# Patient Record
Sex: MALE | Race: WHITE | NOT HISPANIC OR LATINO | Employment: FULL TIME | URBAN - METROPOLITAN AREA
[De-identification: names, ages, dates, MRNs, and addresses within clinical notes are randomized per-mention and may not be internally consistent; named-entity substitution may affect disease eponyms.]

---

## 2020-04-26 ENCOUNTER — HOSPITAL ENCOUNTER (EMERGENCY)
Facility: HOSPITAL | Age: 35
Discharge: HOME | End: 2020-04-26
Attending: EMERGENCY MEDICINE | Admitting: EMERGENCY MEDICINE

## 2020-04-26 ENCOUNTER — APPOINTMENT (OUTPATIENT)
Dept: GENERAL RADIOLOGY | Facility: HOSPITAL | Age: 35
End: 2020-04-26

## 2020-04-26 VITALS
WEIGHT: 171.52 LBS | HEART RATE: 98 BPM | SYSTOLIC BLOOD PRESSURE: 147 MMHG | BODY MASS INDEX: 25.4 KG/M2 | DIASTOLIC BLOOD PRESSURE: 90 MMHG | TEMPERATURE: 99.1 F | RESPIRATION RATE: 21 BRPM | OXYGEN SATURATION: 98 % | HEIGHT: 69 IN

## 2020-04-26 DIAGNOSIS — M21.821 HILL SACHS DEFORMITY, RIGHT: ICD-10-CM
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
## 2020-04-26 DIAGNOSIS — S63.612A SPRAIN OF RIGHT MIDDLE FINGER, UNSPECIFIED SITE OF FINGER, INITIAL ENCOUNTER: ICD-10-CM

## 2020-04-26 DIAGNOSIS — S43.014A ANTERIOR DISLOCATION OF RIGHT SHOULDER, INITIAL ENCOUNTER: ICD-10-CM

## 2020-04-26 DIAGNOSIS — M79.5 FOREIGN BODY (FB) IN SOFT TISSUE: ICD-10-CM

## 2020-04-26 DIAGNOSIS — S81.801A AVULSION OF SKIN OF RIGHT LOWER LEG, INITIAL ENCOUNTER: ICD-10-CM

## 2020-04-26 PROCEDURE — 94770: CPT

## 2020-04-26 PROCEDURE — 73030 X-RAY EXAM OF SHOULDER: CPT

## 2020-04-26 PROCEDURE — 73590 X-RAY EXAM OF LOWER LEG: CPT

## 2020-04-26 PROCEDURE — 25010000002 ONDANSETRON PER 1 MG: Performed by: EMERGENCY MEDICINE

## 2020-04-26 PROCEDURE — 99152 MOD SED SAME PHYS/QHP 5/>YRS: CPT | Performed by: EMERGENCY MEDICINE

## 2020-04-26 PROCEDURE — 99283 EMERGENCY DEPT VISIT LOW MDM: CPT

## 2020-04-26 PROCEDURE — 90715 TDAP VACCINE 7 YRS/> IM: CPT | Performed by: EMERGENCY MEDICINE

## 2020-04-26 PROCEDURE — 23650 CLTX SHO DSLC W/MNPJ WO ANES: CPT | Performed by: EMERGENCY MEDICINE

## 2020-04-26 PROCEDURE — 99282 EMERGENCY DEPT VISIT SF MDM: CPT | Performed by: EMERGENCY MEDICINE

## 2020-04-26 PROCEDURE — 25010000002 FENTANYL CITRATE (PF) 100 MCG/2ML SOLUTION: Performed by: EMERGENCY MEDICINE

## 2020-04-26 PROCEDURE — 94799 UNLISTED PULMONARY SVC/PX: CPT

## 2020-04-26 PROCEDURE — 73130 X-RAY EXAM OF HAND: CPT

## 2020-04-26 PROCEDURE — 25010000002 TDAP 5-2.5-18.5 LF-MCG/0.5 SUSPENSION: Performed by: EMERGENCY MEDICINE

## 2020-04-26 PROCEDURE — 90471 IMMUNIZATION ADMIN: CPT | Performed by: EMERGENCY MEDICINE

## 2020-04-26 PROCEDURE — 73020 X-RAY EXAM OF SHOULDER: CPT

## 2020-04-26 RX ORDER — HYDROCODONE BITARTRATE AND ACETAMINOPHEN 5; 325 MG/1; MG/1
1 TABLET ORAL EVERY 4 HOURS PRN
Qty: 20 TABLET | Refills: 0 | Status: SHIPPED | OUTPATIENT
Start: 2020-04-26

## 2020-04-26 RX ORDER — SODIUM CHLORIDE 9 MG/ML
INJECTION, SOLUTION INTRAVENOUS
Status: DISCONTINUED
Start: 2020-04-26 | End: 2020-04-26 | Stop reason: HOSPADM

## 2020-04-26 RX ORDER — CEPHALEXIN 500 MG/1
500 CAPSULE ORAL ONCE
Status: COMPLETED | OUTPATIENT
Start: 2020-04-26 | End: 2020-04-26

## 2020-04-26 RX ORDER — CEPHALEXIN 500 MG/1
500 CAPSULE ORAL 3 TIMES DAILY
Qty: 15 CAPSULE | Refills: 0 | Status: SHIPPED | OUTPATIENT
Start: 2020-04-26

## 2020-04-26 RX ORDER — FENTANYL CITRATE 50 UG/ML
1 INJECTION, SOLUTION INTRAMUSCULAR; INTRAVENOUS ONCE
Status: COMPLETED | OUTPATIENT
Start: 2020-04-26 | End: 2020-04-26

## 2020-04-26 RX ORDER — ONDANSETRON 2 MG/ML
4 INJECTION INTRAMUSCULAR; INTRAVENOUS ONCE
Status: COMPLETED | OUTPATIENT
Start: 2020-04-26 | End: 2020-04-26

## 2020-04-26 RX ORDER — SODIUM CHLORIDE 0.9 % (FLUSH) 0.9 %
10 SYRINGE (ML) INJECTION AS NEEDED
Status: DISCONTINUED | OUTPATIENT
Start: 2020-04-26 | End: 2020-04-26 | Stop reason: HOSPADM

## 2020-04-26 RX ADMIN — METHOHEXITAL SODIUM 77.8 MG: 500 INJECTION, POWDER, LYOPHILIZED, FOR SOLUTION INTRAMUSCULAR; INTRAVENOUS; RECTAL at 04:21

## 2020-04-26 RX ADMIN — CEPHALEXIN 500 MG: 500 CAPSULE ORAL at 04:41

## 2020-04-26 RX ADMIN — ONDANSETRON 4 MG: 2 INJECTION, SOLUTION INTRAMUSCULAR; INTRAVENOUS at 04:21

## 2020-04-26 RX ADMIN — FENTANYL CITRATE 78 MCG: 50 INJECTION INTRAMUSCULAR; INTRAVENOUS at 04:21

## 2020-04-26 RX ADMIN — TETANUS TOXOID, REDUCED DIPHTHERIA TOXOID AND ACELLULAR PERTUSSIS VACCINE, ADSORBED 0.5 ML: 5; 2.5; 8; 8; 2.5 SUSPENSION INTRAMUSCULAR at 04:21

## 2020-04-26 NOTE — ED PROVIDER NOTES
Subjective   History of Present Illness  History of Present Illness    Chief complaint: Fall with shoulder pain    Location: Right shoulder    Quality/Severity: Severe    Timing/Onset/Duration: Acute onset just prior to arrival    Modifying Factors: It hurts to move, feels better to remain still    Associated Symptoms: No loss of consciousness.  No neck or back pain.  No chest pain or shortness of breath.  No abdominal pain.  No numbness, tingling, weakness, or change in bladder or bowel function.  There is an abrasion noted on the right anterior leg and pain right middle finger.  There is no numbness, tingling, weakness, or change in bladder or bowel function.  No headache.    Narrative: This 35-year-old white male tripped and fell tonight and presents with deformity of the right shoulder.  The patient's last tetanus was 6 years ago.    PCP: No known provider      Review of Systems   Constitutional: Positive for activity change.   HENT: Negative for nosebleeds and rhinorrhea.    Eyes: Negative for visual disturbance.   Respiratory: Negative for shortness of breath.    Cardiovascular: Negative for chest pain.   Gastrointestinal: Negative for abdominal pain, nausea and vomiting.   Genitourinary: Negative for difficulty urinating.   Musculoskeletal: Negative for back pain and neck pain.        Pain right shoulder and right mid finger, abrasion right anterior shin   Skin: Positive for wound.   Neurological: Negative for weakness, numbness and headaches.   Psychiatric/Behavioral: Negative for confusion.        Medication List      You have not been prescribed any medications.       History reviewed. No pertinent past medical history.    No Known Allergies    Past Surgical History:   Procedure Laterality Date   • FINGER SURGERY Right     Right ring finger had tendon surgery.       Family History   Problem Relation Age of Onset   • No Known Problems Mother    • No Known Problems Father        Social History      Socioeconomic History   • Marital status: Single     Spouse name: Not on file   • Number of children: Not on file   • Years of education: Not on file   • Highest education level: Not on file   Tobacco Use   • Smoking status: Former Smoker     Last attempt to quit: 2015     Years since quittin.3   • Smokeless tobacco: Never Used   Substance and Sexual Activity   • Alcohol use: Yes     Frequency: Never   • Drug use: No   • Sexual activity: Defer           Objective   Physical Exam   Constitutional: He is oriented to person, place, and time. He appears well-developed and well-nourished. No distress.   ED Triage Vitals (20 0136)  Temp: 99.1 °F (37.3 °C)  Heart Rate: 80  Resp: 18  BP: 147/90  SpO2: 96 %  Temp src: Oral  Heart Rate Source: Monitor  Patient Position: Lying  BP Location: Left arm  FiO2 (%): n/a    The patient's vitals were reviewed by me.  Unless otherwise noted they are within normal limits.     HENT:   Head: Normocephalic and atraumatic.   Right Ear: External ear normal.   Left Ear: External ear normal.   Nose: Nose normal.   Mouth/Throat: Oropharynx is clear and moist. No oropharyngeal exudate.   Eyes: Pupils are equal, round, and reactive to light. Conjunctivae and EOM are normal. Right eye exhibits no discharge. Left eye exhibits no discharge. No scleral icterus.   Neck: Normal range of motion. Neck supple. No JVD present. No tracheal deviation present. No thyromegaly present.   No tenderness, deformity, or bony step-offs upon palpation the cervical, thoracic, lumbar sacrococcygeal spine.   Cardiovascular: Normal rate, regular rhythm, normal heart sounds and intact distal pulses. Exam reveals no gallop and no friction rub.   No murmur heard.  Pulmonary/Chest: Effort normal and breath sounds normal. No stridor. No respiratory distress. He has no wheezes. He has no rales. He exhibits no tenderness.   Abdominal: Soft. Bowel sounds are normal. He exhibits no distension and no mass. There is  no tenderness. There is no rebound and no guarding. No hernia.   Musculoskeletal: Normal range of motion. He exhibits no edema or deformity.   Tenderness and deformity noted right shoulder.  There is tenderness of the right mid finger with swelling.  The right upper extremity has a capillary refill is less than 2 seconds.  The sensation is intact.  There is decreased range of motion of the right shoulder.  There is a 2+ radial and ulnar pulse.    The right lower extremity has an abrasion on the anterior surface of the shin.  The capillary refill is less than 2 seconds.  The sensation is intact.  There is a 2+ dorsalis pedis and posterior tibial pulse.  There is no joint laxity noted.    The extremities are otherwise without tenderness or deformity and neurovascularly intact   Lymphadenopathy:     He has no cervical adenopathy.   Neurological: He is alert and oriented to person, place, and time. No cranial nerve deficit or sensory deficit. He exhibits normal muscle tone. Coordination normal.   Skin: Skin is warm and dry. Capillary refill takes less than 2 seconds. No rash noted. He is not diaphoretic. No erythema. No pallor.   Psychiatric: His behavior is normal.   Nursing note and vitals reviewed.      Upper Extremity Dislocation  Date/Time: 4/26/2020 2:15 AM  Performed by: Rajiv Tierney MD  Authorized by: Rajiv Tierney MD   Consent: Written consent obtained.  Risks and benefits: risks, benefits and alternatives were discussed  Consent given by: patient  Patient understanding: patient states understanding of the procedure being performed  Patient consent: the patient's understanding of the procedure matches consent given  Procedure consent: procedure consent matches procedure scheduled  Relevant documents: relevant documents present and verified  Test results: test results available and properly labeled  Site marked: the operative site was not marked  Imaging studies: imaging studies available  Required  "items: required blood products, implants, devices, and special equipment available  Time out: Immediately prior to procedure a \"time out\" was called to verify the correct patient, procedure, equipment, support staff and site/side marked as required.  Injury location: shoulder  Location details: right shoulder  Injury type: dislocation  Dislocation type: anterior  Hill-Sachs deformity: no  Chronicity: new  Pre-procedure neurovascular assessment: neurovascularly intact  Pre-procedure distal perfusion: normal  Pre-procedure neurological function: normal  Pre-procedure range of motion: reduced    Anesthesia:  Local anesthesia used: no    Sedation:  Patient sedated: yes  Sedation type: moderate (conscious) sedation  Sedatives: methohexital  Analgesia: fentanyl  Vitals: Vital signs were monitored during sedation.    Manipulation performed: yes  Reduction method: external rotation  Reduction successful: yes  X-ray confirmed reduction: yes  Immobilization: sling  Post-procedure neurovascular assessment: post-procedure neurovascularly intact  Post-procedure distal perfusion: normal  Post-procedure neurological function: normal  Post-procedure range of motion: normal  Patient tolerance: Patient tolerated the procedure well with no immediate complications                 ED Course      04:06, 04/26/20:  The patient was reassessed.  He feels much better.  His vital signs were reviewed and are stable.  After placement of the sling, the right upper extremity was assessed and noted to be neurovascularly intact.  The sling is in good position.  Abdominal exam: Soft nontender no masses positive bowel sounds.  Neurological exam: Conscious alert oriented x4 with no focal deficits noted.  The deep abrasion to the right leg was copiously irrigated and meticulously explored to the base in a bloodless field.  No nerve involvement, or tendon involvement could be appreciated.  No foreign bodies could be visualized.  The x-ray of the right " tib-fib showed no fracture, there was possible foreign body, after irrigating the wound, the x-rays did still show 2 tiny foreign bodies.  The risk versus the benefits of exploring the wound for foreign body tonight were discussed with the patient.  He agrees with the plan to have the patient followed up for his wound and understands that if the wound gets worse, he may need fluoroscopic removal of the foreign bodies.  The wound had bacitracin ointment applied and a sterile dressing.    04:07, 04/26/20:  The patient's diagnosis of fall with right anterior shoulder dislocation with Hill-Sachs deformity, right middle finger sprain, and deep abrasion to the right tib-fib with foreign body was discussed with him.  The patient will be given a prescription for Norco and Keflex.  The patient should call Dr. Donohue on Monday to be seen this week.  The patient should wash the wound on the right leg once a day with soap and water, pat dry, and apply bacitracin for 3 days.  The patient should take Colace as needed as directed for constipation if he is taking the Norco.  The patient should return to the emergency department if there is increased pain, numbness, tingling, weakness, redness, drainage, fever, chills, worse in any way at all.  The patient's questions were answered he will be discharged in good condition.                                                           MDM  No orders to display     Labs Reviewed - No data to display  No results found.    Final diagnoses:   Fall, initial encounter   Anterior dislocation of right shoulder, initial encounter   Hill Sachs deformity, right   Sprain of right middle finger, unspecified site of finger, initial encounter   Avulsion of skin of right lower leg, initial encounter   Foreign body (FB) in soft tissue         ED Medications:  Medications - No data to display    New Medications:     Medication List      You have not been prescribed any medications.       Stopped  Medications:     Medication List      You have not been prescribed any medications.         Final diagnoses:   Fall, initial encounter   Anterior dislocation of right shoulder, initial encounter   Hill Sachs deformity, right   Sprain of right middle finger, unspecified site of finger, initial encounter   Avulsion of skin of right lower leg, initial encounter   Foreign body (FB) in soft tissue            Rajiv Tierney MD  04/26/20 [id number]

## 2020-04-26 NOTE — DISCHARGE INSTRUCTIONS
Follow-up with Dr. Donohue this week.  Turn to the emergency department if there is increased pain, numbness, tingling, weakness, change in color or temperature, redness, drainage, fever, chills, worse in any way at all.  Take Colace as needed as directed for constipation if you are taking the Norco.